# Patient Record
Sex: FEMALE | Race: OTHER | NOT HISPANIC OR LATINO
[De-identification: names, ages, dates, MRNs, and addresses within clinical notes are randomized per-mention and may not be internally consistent; named-entity substitution may affect disease eponyms.]

---

## 2023-06-29 PROBLEM — Z00.00 ENCOUNTER FOR PREVENTIVE HEALTH EXAMINATION: Status: ACTIVE | Noted: 2023-06-29

## 2023-06-30 ENCOUNTER — APPOINTMENT (OUTPATIENT)
Dept: ORTHOPEDIC SURGERY | Facility: CLINIC | Age: 53
End: 2023-06-30
Payer: MEDICARE

## 2023-06-30 ENCOUNTER — RESULT CHARGE (OUTPATIENT)
Age: 53
End: 2023-06-30

## 2023-06-30 DIAGNOSIS — M18.11 UNILATERAL PRIMARY OSTEOARTHRITIS OF FIRST CARPOMETACARPAL JOINT, RIGHT HAND: ICD-10-CM

## 2023-06-30 DIAGNOSIS — M65.312 TRIGGER THUMB, LEFT THUMB: ICD-10-CM

## 2023-06-30 PROCEDURE — 73140 X-RAY EXAM OF FINGER(S): CPT | Mod: 50

## 2023-06-30 PROCEDURE — 99203 OFFICE O/P NEW LOW 30 MIN: CPT

## 2023-06-30 NOTE — DATA REVIEWED
[FreeTextEntry1] : X-rays of the bilateral thumb taken in the office today: Mild CMC arthritis appreciated.  No acute fractures, subluxations, or dislocations.\par

## 2023-06-30 NOTE — IMAGING
[de-identified] : R hand: \par +thumb CMC swelling \par +thumb CMC tenderness \par Decreased thumb ROM \par +Basal grind test\par \par L hand: \par Mild swelling \par Tender 1st A1 pulley \par Decreased thumb ROM \par +thumb triggering\par

## 2023-06-30 NOTE — DISCUSSION/SUMMARY
[de-identified] : The patient was advised of the diagnosis. The natural history of the pathology was explained in full to the patient in layman's terms. We reviewed that the forces applied to the thumb tip are magnified 12-14 times at the thumb cmc joint.  We also reviewed the progression of arthritis with early arthritis showing minimal to no xray changes.  We discussed treatment options, including activity modification(demonstrated), medicine(topical and oral), bracing, therapy, injection and eventually surgery.  We reviewed the r/b of each.  All questions were answered and the patient verbalized understanding\par \par My impression is that this patient has stenosing tenosynovitis of the finger, more commonly known as a trigger finger.  I recommended that the patient undergo a trigger finger injection. The risks, benefits, and alternatives were discussed with the patient in detail. This included (but was not limited to) pain, infection, subcutaneous atrophy, skin depigmentation, elevated glucose etc... The patient agreed to undergo a steroid injection. Under informed consent and sterile conditions, 0.1 cc of 1% plain lidocaine  and 0.9 cc of dexamethasone was precisely injected into the patient's finger. A sterile Band-Aid was placed.  It is my hope that this significantly alleviates the patient's symptoms.\par \par She will follow back in 1 month for repeat injection to her trigger thumb if her symptoms do not improve.  I also gave her the right AOA brace in the office today to use it when performing physical activity that which worsens her pain.\par All questions and concerns addressed to patient's satisfaction. Patient expresses full understanding of treatment plan.\par

## 2023-06-30 NOTE — HISTORY OF PRESENT ILLNESS
[de-identified] : 53-year-old female here for evaluation of left trigger thumb and right CMC arthritis.  She reports the triggering/locking left thumb has been going on for about 1 to 2 months.  She also reports aching pain at the base of her right thumb that which worsens upon lifting, pushing, and pinching motions.  She takes Tylenol over-the-counter with mild relief.

## 2023-08-04 ENCOUNTER — APPOINTMENT (OUTPATIENT)
Dept: ORTHOPEDIC SURGERY | Facility: CLINIC | Age: 53
End: 2023-08-04

## 2024-02-08 ENCOUNTER — APPOINTMENT (OUTPATIENT)
Dept: ORTHOPEDIC SURGERY | Facility: CLINIC | Age: 54
End: 2024-02-08